# Patient Record
Sex: MALE | Race: BLACK OR AFRICAN AMERICAN | NOT HISPANIC OR LATINO | URBAN - METROPOLITAN AREA
[De-identification: names, ages, dates, MRNs, and addresses within clinical notes are randomized per-mention and may not be internally consistent; named-entity substitution may affect disease eponyms.]

---

## 2019-12-02 ENCOUNTER — EMERGENCY (EMERGENCY)
Facility: HOSPITAL | Age: 24
LOS: 1 days | Discharge: HOME | End: 2019-12-02
Attending: EMERGENCY MEDICINE | Admitting: EMERGENCY MEDICINE
Payer: COMMERCIAL

## 2019-12-02 VITALS
SYSTOLIC BLOOD PRESSURE: 126 MMHG | TEMPERATURE: 98 F | HEIGHT: 69 IN | RESPIRATION RATE: 18 BRPM | HEART RATE: 83 BPM | DIASTOLIC BLOOD PRESSURE: 72 MMHG | OXYGEN SATURATION: 97 % | WEIGHT: 160.06 LBS

## 2019-12-02 PROCEDURE — 96375 TX/PRO/DX INJ NEW DRUG ADDON: CPT

## 2019-12-02 PROCEDURE — 96374 THER/PROPH/DIAG INJ IV PUSH: CPT

## 2019-12-02 PROCEDURE — 99284 EMERGENCY DEPT VISIT MOD MDM: CPT | Mod: 25

## 2019-12-02 PROCEDURE — 99284 EMERGENCY DEPT VISIT MOD MDM: CPT

## 2019-12-02 RX ORDER — FAMOTIDINE 10 MG/ML
1 INJECTION INTRAVENOUS
Qty: 5 | Refills: 0
Start: 2019-12-02 | End: 2019-12-06

## 2019-12-02 RX ORDER — EPINEPHRINE 0.3 MG/.3ML
0.3 INJECTION INTRAMUSCULAR; SUBCUTANEOUS
Qty: 2 | Refills: 0
Start: 2019-12-02

## 2019-12-02 RX ORDER — FAMOTIDINE 10 MG/ML
20 INJECTION INTRAVENOUS ONCE
Refills: 0 | Status: COMPLETED | OUTPATIENT
Start: 2019-12-02 | End: 2019-12-02

## 2019-12-02 RX ORDER — DIPHENHYDRAMINE HCL 50 MG
50 CAPSULE ORAL ONCE
Refills: 0 | Status: COMPLETED | OUTPATIENT
Start: 2019-12-02 | End: 2019-12-02

## 2019-12-02 RX ADMIN — FAMOTIDINE 20 MILLIGRAM(S): 10 INJECTION INTRAVENOUS at 23:41

## 2019-12-02 RX ADMIN — Medication 50 MILLIGRAM(S): at 23:41

## 2019-12-02 NOTE — ED ADULT NURSE NOTE - OBJECTIVE STATEMENT
pt ate nuts 1.5 hours ago with a known nut allergy and broke out in hives.  went to [facility] and was given solumedrol, benadryl, and epi.  presents here with no hives but redness and itching remains.  no sob.  no drooling.  speech clear.  no n/v.  iv intact from [facility]

## 2019-12-02 NOTE — ED PROVIDER NOTE - CLINICAL SUMMARY MEDICAL DECISION MAKING FREE TEXT BOX
Pt s/p allergic reaction, improving after im meds at [facility] and po benadryl taken by pt.  Will give additional benadryl and pepcid, monitor for change in sx.  Plan for dov w po meds, rusty allergy.  Discussed avoiding similar foods, stone fruits, rusty [facility]ullen allergist.

## 2019-12-02 NOTE — ED PROVIDER NOTE - CARE PROVIDER_API CALL
Samantha Wilkins)  Allergy and Immunology; Internal Medicine  [address], [facility] [address]  [place] [address]  Phone: [phone]  Fax: [phone]  Follow Up Time:

## 2019-12-02 NOTE — ED ADULT TRIAGE NOTE - CHIEF COMPLAINT QUOTE
hives and itching to body s/p eating nuts (known nut allergy); took 50 mg benadryl at home and given 0.2 mg epinephrine and 125 mg solumedrol at [facility]; pt denies any respiratory distress at this time, speaking in complete sentences

## 2019-12-02 NOTE — ED PROVIDER NOTE - OBJECTIVE STATEMENT
23 yo male h/o prior allergic reaction to Kind bar c/o itching and hives w throat tightness after eating almonds and cashews.  Pt took 2 benadryl and went to [facility] where he got solumedrol 125 im and epi x 1.  Pt feels some mild itching but states throat sx resolved, itching much improved.  No lip/tongue/throat swelling now.  No sob.  No abd pain, n/v/d. No dizziness.  No other complaint.

## 2019-12-02 NOTE — ED PROVIDER NOTE - PATIENT PORTAL LINK FT
You can access the FollowMyHealth Patient Portal offered by [facility] by registering at the following website: [email or web address]. By joining [facility]’s FollowMyHealth portal, you will also be able to view your health information using other applications (apps) compatible with our system.

## 2019-12-02 NOTE — ED PROVIDER NOTE - CROS ED ROS STATEMENT
Still declines a screening colonoscopy,  Check a stool tube test.    all other ROS negative except as per HPI

## 2019-12-02 NOTE — ED ADULT NURSE NOTE - CHIEF COMPLAINT QUOTE
hives and itching to body s/p eating nuts (known nut allergy); took 50 mg benadryl at home and given 0.2 mg epinephrine and 125 mg solumedrol at [facility]; pt denies any respiratory distress at this time, speaking in complete sentences

## 2019-12-02 NOTE — ED PROVIDER NOTE - NSFOLLOWUPINSTRUCTIONS_ED_ALL_ED_FT
Allergic reaction    Take prednisone and pepcid once a day for 5 days.  Use benadryl 2 tab every 4-6 hours for swelling/itch/rash.  Use the epipen as directed and immediately return to ED or call 911 for lip/tongue/throat swelling or difficulty breathing.  Return for increased or new symptoms, any other concerns.  Follow up with your pmd and an allergist.  Avoid nuts and stone fruits.    Allergic Reaction    An allergic reaction is an abnormal reaction to a substance (allergen) by the body's defense system. Common allergens include medicines, food, insect bites or stings, and blood products. The body releases certain proteins into the blood that can cause a variety of symptoms such as an itchy rash, wheezing, swelling of the face/lips/tongue/throat, abdominal pain, nausea or vomiting. An allergic reaction is usually treated with medication. If your health care provider prescribed you an epinephrine injection device, make sure to keep it with you at all times.    SEEK IMMEDIATE MEDICAL CARE IF YOU HAVE ANY OF THE FOLLOWING SYMPTOMS: allergic reaction severe enough that required you to use epinephrine, tightness in your chest, swelling around your lips/tongue/throat, abdominal pain, vomiting or diarrhea, or lightheadedness/dizziness. These symptoms may represent a serious problem that is an emergency. Do not wait to see if the symptoms will go away. Use your auto-injector pen or anaphylaxis kit as you have been instructed. Call 911 and do not drive yourself to the hospital.

## 2019-12-03 VITALS
HEART RATE: 70 BPM | OXYGEN SATURATION: 99 % | RESPIRATION RATE: 16 BRPM | SYSTOLIC BLOOD PRESSURE: 117 MMHG | DIASTOLIC BLOOD PRESSURE: 65 MMHG

## 2019-12-09 DIAGNOSIS — T78.1XXA OTHER ADVERSE FOOD REACTIONS, NOT ELSEWHERE CLASSIFIED, INITIAL ENCOUNTER: ICD-10-CM

## 2020-08-20 NOTE — ED ADULT NURSE NOTE - NS_SISCREENINGSR_GEN_ALL_ED
----- Message from Christa Ibarra LPN sent at 8/20/2020 10:16 AM CDT -----  Good morning, the provider wanted to know if you have any sooner appt available for this pt. Please and thank you.    
Negative

## 2021-01-07 NOTE — ED ADULT NURSE NOTE - NS ED PATIENT SAFETY CONCERN
The ldl is up significantly from when we checked it last (bad cholesterol).  No meds are needed.  Work on a low fat diet primary found in red meat and full fat dairy products.  Eliminate trans fats (partially hydrogenated vegetable oil) food in margarines, store bought cookies, crackers cakes.  Eat food rich in omega 3 fatty acids including salmon mackerel herring walnuts and flaxseeds.  Increase soluble fiber in oatmeal, kidney beans, apple and pears.  Add whey protein.  Also exercise and losing weight can help.  Test for HIV came back negative.  Recheck in a year    Kayla   No

## 2021-12-09 NOTE — ED ADULT NURSE NOTE - NS_ED_NURSE_TEACHING_TOPIC_ED_A_ED
Telemetry Bed?: Yes   Admitting Physician: ARTEMIO LACEY [[id number]]   Is this a telephone or verbal order?: This is a telephone order from the admitting physician   allergic rx

## 2022-08-19 NOTE — ED PROVIDER NOTE - MDM ORDERS SUBMITTED SELECTION
1. \"Have you been to the ER, urgent care clinic since your last visit? Hospitalized since your last visit? \" No    2. \"Have you seen or consulted any other health care providers outside of the [address] [place] [address] since your last visit? \" No     3. For patients aged 39-70: Has the patient had a colonoscopy / FIT/ Cologuard? No      If the patient is female:    4. For patients aged 41-77: Has the patient had a mammogram within the past 2 years? Yes - Care Gap present. Most recent result on file      5. For patients aged 21-65: Has the patient had a pap smear? Yes - no Care Gap present     Chief Complaint   Patient presents with    Follow-up    Hyperthyroidism     Visit Vitals  /80 (BP 1 Location: Left upper arm, BP Patient Position: Sitting, BP Cuff Size: Adult)   Pulse (!) 52   Temp 98.4 °F (36.9 °C) (Oral)   Resp 14   Ht 6' 2\" (1.88 m)   Wt 225 lb 9.6 oz (102.3 kg)   SpO2 98%   BMI 28.97 kg/m²     Pt is here for a 3 month follow up. Medications

## 2023-04-24 NOTE — ED ADULT NURSE NOTE - CAS DISCH TRANSFER METHOD
Private car Topical Ketoconazole Counseling: Patient counseled that this medication may cause skin irritation or allergic reactions.  In the event of skin irritation, the patient was advised to reduce the amount of the drug applied or use it less frequently.   The patient verbalized understanding of the proper use and possible adverse effects of ketoconazole.  All of the patient's questions and concerns were addressed.